# Patient Record
Sex: FEMALE | Race: BLACK OR AFRICAN AMERICAN
[De-identification: names, ages, dates, MRNs, and addresses within clinical notes are randomized per-mention and may not be internally consistent; named-entity substitution may affect disease eponyms.]

---

## 2020-11-07 ENCOUNTER — TRANSCRIPTION ENCOUNTER (OUTPATIENT)
Age: 45
End: 2020-11-07

## 2022-04-07 PROBLEM — Z00.00 ENCOUNTER FOR PREVENTIVE HEALTH EXAMINATION: Status: ACTIVE | Noted: 2022-04-07

## 2022-04-08 ENCOUNTER — APPOINTMENT (OUTPATIENT)
Dept: ORTHOPEDIC SURGERY | Facility: CLINIC | Age: 47
End: 2022-04-08
Payer: OTHER MISCELLANEOUS

## 2022-04-08 VITALS — BODY MASS INDEX: 27.62 KG/M2 | WEIGHT: 176 LBS | HEIGHT: 67 IN

## 2022-04-08 DIAGNOSIS — Z78.9 OTHER SPECIFIED HEALTH STATUS: ICD-10-CM

## 2022-04-08 PROCEDURE — 99214 OFFICE O/P EST MOD 30 MIN: CPT

## 2022-04-08 PROCEDURE — 99072 ADDL SUPL MATRL&STAF TM PHE: CPT

## 2022-04-08 RX ORDER — CYCLOBENZAPRINE HYDROCHLORIDE 5 MG/1
5 TABLET, FILM COATED ORAL
Refills: 0 | Status: ACTIVE | COMMUNITY

## 2022-04-08 RX ORDER — IBUPROFEN 800 MG/1
800 TABLET, FILM COATED ORAL
Refills: 0 | Status: ACTIVE | COMMUNITY

## 2022-04-10 NOTE — DISCUSSION/SUMMARY
[de-identified] : Symptoms persist - continue with HEP - Flexeril renewed - FMLA paperwork needed - fu in 6 weeks

## 2022-04-10 NOTE — HISTORY OF PRESENT ILLNESS
[Lower back] : lower back [5] : 5 [Dull/Aching] : dull/aching [Localized] : localized [Intermittent] : intermittent [Sitting] : sitting [Full time] : Work status: full time [de-identified] : 5/1/20: here for lower back - overall doing worse at this point - legs are okay - having off and on day for this - legs are mostly okay - wore with activity \par working from home but pain flared up and had to take the day off - generally trying to work through\par \par The PT wasn't approved \par The medication not approved\par \par 4/8/22: Follow up today - symptoms continue with intermittent exacerbations - managing with HEP  [] : Post Surgical Visit: no [FreeTextEntry5] : Wc follow up \par patient states pain is still the same  [de-identified] :

## 2022-04-10 NOTE — WORK
[Partial] : partial [15+ days] : 15+ days [Patient] : patient [FreeTextEntry1] : fair\par The patient is currently working without limitations

## 2023-05-07 ENCOUNTER — APPOINTMENT (OUTPATIENT)
Dept: ORTHOPEDIC SURGERY | Facility: CLINIC | Age: 48
End: 2023-05-07
Payer: OTHER MISCELLANEOUS

## 2023-05-07 VITALS — HEIGHT: 67 IN | WEIGHT: 179 LBS | BODY MASS INDEX: 28.09 KG/M2

## 2023-05-07 PROCEDURE — 99213 OFFICE O/P EST LOW 20 MIN: CPT | Mod: ACP

## 2023-05-07 RX ORDER — MELOXICAM 15 MG/1
15 TABLET ORAL
Qty: 30 | Refills: 0 | Status: ACTIVE | COMMUNITY
Start: 2023-05-07 | End: 1900-01-01

## 2023-05-07 RX ORDER — METHOCARBAMOL 750 MG/1
750 TABLET, FILM COATED ORAL TWICE DAILY
Qty: 60 | Refills: 0 | Status: ACTIVE | COMMUNITY
Start: 2023-05-07 | End: 1900-01-01

## 2023-05-07 NOTE — HISTORY OF PRESENT ILLNESS
[Lower back] : lower back [5] : 5 [Dull/Aching] : dull/aching [Localized] : localized [Intermittent] : intermittent [Sitting] : sitting [Full time] : Work status: full time [] : Post Surgical Visit: no [FreeTextEntry1] : back  [de-identified] :  [FreeTextEntry5] : Wc follow up \par patient states pain is still the same  [de-identified] : 5/1/20: here for lower back - overall doing worse at this point - legs are okay - having off and on day for this - legs are mostly okay - wore with activity \par working from home but pain flared up and had to take the day off - generally trying to work through\par \par The PT wasn't approved \par The medication not approved\par \par 4/8/22: Follow up today - symptoms continue with intermittent exacerbations - managing with HEP

## 2023-05-07 NOTE — WORK
[15+ days] : 15+ days [Partial] : partial [Patient] : patient [FreeTextEntry1] : fair\par The patient is currently working without limitations [Medium Work:] : Medium Work: Exerting 20 to 50 pounds of force occasionally, and/or 10 to 25 pounds of force frequently, and/or greater than negligible up to 10 pounds of force constantly to move objects. Physical demand requirements are in excess of those for Light Work

## 2023-05-07 NOTE — ASSESSMENT
[FreeTextEntry1] : will get her back into therapy\par robaxin and mobic renewed f/u 3 weeks Dr Suggs

## 2023-05-12 ENCOUNTER — APPOINTMENT (OUTPATIENT)
Dept: ORTHOPEDIC SURGERY | Facility: CLINIC | Age: 48
End: 2023-05-12

## 2023-06-09 ENCOUNTER — APPOINTMENT (OUTPATIENT)
Dept: ORTHOPEDIC SURGERY | Facility: CLINIC | Age: 48
End: 2023-06-09
Payer: OTHER MISCELLANEOUS

## 2023-06-09 VITALS — BODY MASS INDEX: 28.09 KG/M2 | WEIGHT: 179 LBS | HEIGHT: 67 IN

## 2023-06-09 PROCEDURE — 99213 OFFICE O/P EST LOW 20 MIN: CPT | Mod: ACP

## 2023-06-09 PROCEDURE — 99213 OFFICE O/P EST LOW 20 MIN: CPT

## 2023-06-09 RX ORDER — CYCLOBENZAPRINE HYDROCHLORIDE 5 MG/1
5 TABLET, FILM COATED ORAL 3 TIMES DAILY
Qty: 60 | Refills: 1 | Status: ACTIVE | COMMUNITY
Start: 2022-04-08 | End: 1900-01-01

## 2023-06-09 NOTE — DISCUSSION/SUMMARY
[de-identified] : Symptoms persist - continue with HEP - Flexeril renewed - \par Patient is currently working in office with modifications as needed\par  fu in 6 weeks\par \par Patient seen by THERON Cooper under the direct supervision of Dave Suggs MD\par

## 2023-06-09 NOTE — HISTORY OF PRESENT ILLNESS
[Lower back] : lower back [Work related] : work related [4] : 4 [Dull/Aching] : dull/aching [Localized] : localized [Intermittent] : intermittent [Sitting] : sitting [Full time] : Work status: full time [de-identified] : 5/1/20: here for lower back - overall doing worse at this point - legs are okay - having off and on day for this - legs are mostly okay - wore with activity \par working from home but pain flared up and had to take the day off - generally trying to work through\par \par The PT wasn't approved \par The medication not approved\par \par 4/8/23: Follow up today - symptoms continue with intermittent exacerbations - managing with HEP \par \par 6/9/2023: Patient presents today - symtoms persist with intermittent exacerbations- managing with HEP and prescription medications.   [] : Post Surgical Visit: no [FreeTextEntry3] : 5/6/2016 [FreeTextEntry5] : Wc follow up \par patient states pain is still the same , on 5/7/2023 pt went to urgent care , she experienced a flair up [de-identified] :

## 2023-08-25 ENCOUNTER — APPOINTMENT (OUTPATIENT)
Dept: ORTHOPEDIC SURGERY | Facility: CLINIC | Age: 48
End: 2023-08-25
Payer: OTHER MISCELLANEOUS

## 2023-08-25 VITALS — HEIGHT: 67 IN | WEIGHT: 179 LBS | BODY MASS INDEX: 28.09 KG/M2

## 2023-08-25 PROCEDURE — 99214 OFFICE O/P EST MOD 30 MIN: CPT

## 2023-08-25 RX ORDER — CYCLOBENZAPRINE HYDROCHLORIDE 10 MG/1
10 TABLET, FILM COATED ORAL
Qty: 60 | Refills: 0 | Status: ACTIVE | COMMUNITY
Start: 2023-08-25 | End: 1900-01-01

## 2023-08-25 NOTE — WORK
[Partial] : partial [Patient] : patient [FreeTextEntry1] : fair\par  The patient is currently working without limitations

## 2023-08-25 NOTE — DISCUSSION/SUMMARY
[de-identified] : Symptoms persist - continue with HEP - Flexeril renewed -  Patient is currently working in office with modifications as needed  fu in 8 weeks

## 2023-08-25 NOTE — HISTORY OF PRESENT ILLNESS
[Lower back] : lower back [Work related] : work related [Dull/Aching] : dull/aching [Localized] : localized [Intermittent] : intermittent [Sitting] : sitting [Full time] : Work status: full time [7] : 7 [3] : 3 [de-identified] : 5/1/20: here for lower back - overall doing worse at this point - legs are okay - having off and on day for this - legs are mostly okay - wore with activity  working from home but pain flared up and had to take the day off - generally trying to work through  The PT wasn't approved  The medication not approved  4/8/23: Follow up today - symptoms continue with intermittent exacerbations - managing with HEP   6/9/2023: Patient presents today - symtoms persist with intermittent exacerbations- managing with HEP and prescription medications.    8/25/23: here for fu - pain continues - has been using HEP and flexeril - most of the pain in the lower back - not shooting down the legs  PT hasnt been approved working with accomodations  [] : Post Surgical Visit: no [FreeTextEntry3] : 5/6/2016 [FreeTextEntry5] : Wc follow up patient states pain is still the same, states pain varies, home exercises help with the pain [de-identified] : home exercises  [de-identified] :

## 2023-09-28 ENCOUNTER — NON-APPOINTMENT (OUTPATIENT)
Age: 48
End: 2023-09-28

## 2023-10-27 ENCOUNTER — APPOINTMENT (OUTPATIENT)
Dept: ORTHOPEDIC SURGERY | Facility: CLINIC | Age: 48
End: 2023-10-27

## 2024-04-19 ENCOUNTER — APPOINTMENT (OUTPATIENT)
Dept: ORTHOPEDIC SURGERY | Facility: CLINIC | Age: 49
End: 2024-04-19
Payer: OTHER MISCELLANEOUS

## 2024-04-19 PROCEDURE — 99213 OFFICE O/P EST LOW 20 MIN: CPT

## 2024-04-19 RX ORDER — TIZANIDINE 4 MG/1
4 TABLET ORAL
Qty: 40 | Refills: 0 | Status: ACTIVE | COMMUNITY
Start: 2024-04-19 | End: 1900-01-01

## 2024-04-19 RX ORDER — LIDOCAINE 5% 700 MG/1
5 PATCH TOPICAL
Qty: 1 | Refills: 0 | Status: ACTIVE | COMMUNITY
Start: 2024-04-19 | End: 1900-01-01

## 2024-04-19 RX ORDER — CELECOXIB 200 MG/1
200 CAPSULE ORAL
Qty: 60 | Refills: 0 | Status: ACTIVE | COMMUNITY
Start: 2024-04-19 | End: 1900-01-01

## 2024-04-20 NOTE — DISCUSSION/SUMMARY
[de-identified] : Symptoms persist - continue with HEP -  RX changed to Tizanidine, Celebrex and Lidocaine patches sent Patient is currently working in office with modifications as needed  fu in 4-6 weeks

## 2024-04-20 NOTE — HISTORY OF PRESENT ILLNESS
[de-identified] : 5/1/20: here for lower back - overall doing worse at this point - legs are okay - having off and on day for this - legs are mostly okay - wore with activity  working from home but pain flared up and had to take the day off - generally trying to work through  The PT wasn't approved  The medication not approved  4/8/23: Follow up today - symptoms continue with intermittent exacerbations - managing with HEP   6/9/2023: Patient presents today - symtoms persist with intermittent exacerbations- managing with HEP and prescription medications.    8/25/23: here for fu - pain continues - has been using HEP and flexeril - most of the pain in the lower back - not shooting down the legs  PT hasnt been approved working with accomodations   4/19/24: Here for follow up. She has had a flare up over the past 2 days without injury. She is taking Flexeril. She is requesting a new medication. She is currently working.  Not having any side effectrs from the medication [FreeTextEntry5] : GERARDO is here today to follow up on her lower back. states she was doing okay until a recent flare up. states Flexeril helped, ran out. taking cyclobenzaprine. denies radiating symptoms. pain with bending.

## 2024-05-31 ENCOUNTER — APPOINTMENT (OUTPATIENT)
Dept: ORTHOPEDIC SURGERY | Facility: CLINIC | Age: 49
End: 2024-05-31
Payer: OTHER MISCELLANEOUS

## 2024-05-31 DIAGNOSIS — M51.9 UNSPECIFIED THORACIC, THORACOLUMBAR AND LUMBOSACRAL INTERVERTEBRAL DISC DISORDER: ICD-10-CM

## 2024-05-31 DIAGNOSIS — M51.26 OTHER INTERVERTEBRAL DISC DISPLACEMENT, LUMBAR REGION: ICD-10-CM

## 2024-05-31 PROCEDURE — 72110 X-RAY EXAM L-2 SPINE 4/>VWS: CPT

## 2024-05-31 PROCEDURE — 99214 OFFICE O/P EST MOD 30 MIN: CPT

## 2024-05-31 RX ORDER — METHYLPREDNISOLONE 4 MG/1
4 TABLET ORAL
Qty: 1 | Refills: 0 | Status: ACTIVE | COMMUNITY
Start: 2024-05-31 | End: 1900-01-01

## 2024-05-31 NOTE — HISTORY OF PRESENT ILLNESS
[Lower back] : lower back [6] : 6 [5] : 5 [Radiating] : radiating [Tightness] : tightness [Intermittent] : intermittent [Nothing helps with pain getting better] : Nothing helps with pain getting better [de-identified] : 5/1/20: here for lower back - overall doing worse at this point - legs are okay - having off and on day for this - legs are mostly okay - wore with activity  working from home but pain flared up and had to take the day off - generally trying to work through  The PT wasn't approved  The medication not approved  4/8/23: Follow up today - symptoms continue with intermittent exacerbations - managing with HEP   6/9/2023: Patient presents today - symtoms persist with intermittent exacerbations- managing with HEP and prescription medications.    8/25/23: here for fu - pain continues - has been using HEP and flexeril - most of the pain in the lower back - not shooting down the legs  PT hasnt been approved working with accomodations   4/19/24: Here for follow up. She has had a flare up over the past 2 days without injury. She is taking Flexeril. She is requesting a new medication. She is currently working.  Not having any side effectrs from the medication   -----------------------  05/31/24 - pt is here for [facility] , pt states she had tight pain for the last few days. pain increases at night.  got worse on tues/wed  Had some muscle relaxer which didnt help  xrays today: L spine-  loss of disc hieght at l5-S1 (we compared to 2019 and there is no change)  [] : no [FreeTextEntry5] : GERARDO is here today to follow up on her lower back. states she was doing okay until a recent flare up. states Flexeril helped, ran out. taking cyclobenzaprine. denies radiating symptoms. pain with bending.

## 2024-05-31 NOTE — DISCUSSION/SUMMARY
[Medication Risks Reviewed] : Medication risks reviewed [de-identified] : reviewed the case and the imaging  back pain worsening  L5-S1 disc is collapsed MDP mri L spine for worsening symptoms

## 2024-06-10 ENCOUNTER — APPOINTMENT (OUTPATIENT)
Dept: MRI IMAGING | Facility: CLINIC | Age: 49
End: 2024-06-10
Payer: OTHER MISCELLANEOUS

## 2024-06-10 PROCEDURE — 72148 MRI LUMBAR SPINE W/O DYE: CPT

## 2024-06-28 ENCOUNTER — APPOINTMENT (OUTPATIENT)
Dept: ORTHOPEDIC SURGERY | Facility: CLINIC | Age: 49
End: 2024-06-28

## 2025-08-01 ENCOUNTER — APPOINTMENT (OUTPATIENT)
Dept: ORTHOPEDIC SURGERY | Facility: CLINIC | Age: 50
End: 2025-08-01
Payer: COMMERCIAL

## 2025-08-01 VITALS — BODY MASS INDEX: 28.09 KG/M2 | WEIGHT: 179 LBS | HEIGHT: 67 IN

## 2025-08-01 DIAGNOSIS — M94.20 CHONDROMALACIA, UNSPECIFIED SITE: ICD-10-CM

## 2025-08-01 DIAGNOSIS — M23.91 UNSPECIFIED INTERNAL DERANGEMENT OF RIGHT KNEE: ICD-10-CM

## 2025-08-01 DIAGNOSIS — Z00.00 ENCOUNTER FOR GENERAL ADULT MEDICAL EXAMINATION W/OUT ABNORMAL FINDINGS: ICD-10-CM

## 2025-08-01 DIAGNOSIS — M23.92 UNSPECIFIED INTERNAL DERANGEMENT OF LEFT KNEE: ICD-10-CM

## 2025-08-01 PROCEDURE — 73562 X-RAY EXAM OF KNEE 3: CPT | Mod: 50

## 2025-08-01 PROCEDURE — 99214 OFFICE O/P EST MOD 30 MIN: CPT | Mod: 25

## 2025-08-01 RX ORDER — DICLOFENAC SODIUM 75 MG/1
75 TABLET, DELAYED RELEASE ORAL TWICE DAILY
Qty: 60 | Refills: 3 | Status: ACTIVE | COMMUNITY
Start: 2025-08-01 | End: 2025-11-29

## 2025-08-11 ENCOUNTER — APPOINTMENT (OUTPATIENT)
Dept: MRI IMAGING | Facility: CLINIC | Age: 50
End: 2025-08-11

## 2025-08-21 ENCOUNTER — APPOINTMENT (OUTPATIENT)
Dept: ORTHOPEDIC SURGERY | Facility: CLINIC | Age: 50
End: 2025-08-21